# Patient Record
Sex: FEMALE | Race: WHITE | NOT HISPANIC OR LATINO
[De-identification: names, ages, dates, MRNs, and addresses within clinical notes are randomized per-mention and may not be internally consistent; named-entity substitution may affect disease eponyms.]

---
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
## 2019-08-25 ENCOUNTER — TRANSCRIPTION ENCOUNTER (OUTPATIENT)
Age: 19
End: 2019-08-25

## 2020-02-28 ENCOUNTER — APPOINTMENT (OUTPATIENT)
Dept: OBGYN | Facility: CLINIC | Age: 20
End: 2020-02-28

## 2021-09-11 ENCOUNTER — APPOINTMENT (OUTPATIENT)
Dept: OPHTHALMOLOGY | Facility: CLINIC | Age: 21
End: 2021-09-11
Payer: COMMERCIAL

## 2021-09-11 ENCOUNTER — NON-APPOINTMENT (OUTPATIENT)
Age: 21
End: 2021-09-11

## 2021-09-11 PROCEDURE — 92014 COMPRE OPH EXAM EST PT 1/>: CPT

## 2021-09-11 PROCEDURE — 92015 DETERMINE REFRACTIVE STATE: CPT

## 2022-09-10 ENCOUNTER — APPOINTMENT (OUTPATIENT)
Dept: OPHTHALMOLOGY | Facility: CLINIC | Age: 22
End: 2022-09-10

## 2022-09-10 ENCOUNTER — NON-APPOINTMENT (OUTPATIENT)
Age: 22
End: 2022-09-10

## 2022-09-10 PROCEDURE — 92014 COMPRE OPH EXAM EST PT 1/>: CPT

## 2023-02-21 ENCOUNTER — OFFICE (OUTPATIENT)
Dept: URBAN - METROPOLITAN AREA CLINIC 12 | Facility: CLINIC | Age: 23
Setting detail: OPHTHALMOLOGY
End: 2023-02-21
Payer: COMMERCIAL

## 2023-02-21 DIAGNOSIS — H16.223: ICD-10-CM

## 2023-02-21 PROCEDURE — 99024 POSTOP FOLLOW-UP VISIT: CPT | Performed by: OPTOMETRIST

## 2023-02-21 ASSESSMENT — REFRACTION_AUTOREFRACTION
OD_CYLINDER: -0.25
OD_SPHERE: -0.25
OD_AXIS: 073
OS_CYLINDER: -0.50
OS_AXIS: 082
OS_SPHERE: -0.25

## 2023-02-21 ASSESSMENT — REFRACTION_MANIFEST
OS_AXIS: 033
OD_VA1: 20/25
OS_SPHERE: -6.00
OS_CYLINDER: -1.00
OD_CYLINDER: -1.00
OD_AXIS: 160
OD_SPHERE: -5.75
OS_VA1: 20/30-

## 2023-02-21 ASSESSMENT — SPHEQUIV_DERIVED
OD_SPHEQUIV: -0.375
OS_SPHEQUIV: -6.5
OS_SPHEQUIV: -0.5
OD_SPHEQUIV: -6.25

## 2023-02-21 ASSESSMENT — CONFRONTATIONAL VISUAL FIELD TEST (CVF)
OS_FINDINGS: FULL
OD_FINDINGS: FULL

## 2023-02-21 ASSESSMENT — KERATOMETRY
OS_K2POWER_DIOPTERS: 40.25
OS_K1POWER_DIOPTERS: 40.00
OD_AXISANGLE_DEGREES: 078
OD_K1POWER_DIOPTERS: 40.00
OD_K2POWER_DIOPTERS: 40.50
OS_AXISANGLE_DEGREES: 139

## 2023-02-21 ASSESSMENT — AXIALLENGTH_DERIVED
OD_AL: 25.0124
OD_AL: 27.88
OS_AL: 25.1192
OS_AL: 28.0819

## 2023-02-21 ASSESSMENT — REFRACTION_CURRENTRX
OS_SPHERE: -5.50
OS_VPRISM_DIRECTION: SV
OD_CYLINDER: -0.75
OS_AXIS: 030
OS_OVR_VA: 20/
OD_SPHERE: -5.75
OS_CYLINDER: -0.75
OD_VPRISM_DIRECTION: SV
OD_AXIS: 147
OD_OVR_VA: 20/

## 2023-02-21 ASSESSMENT — PACHYMETRY
OD_CT_CORRECTION: 4
OS_CT_UM: 495
OS_CT_CORRECTION: 4
OD_CT_UM: 492

## 2023-02-21 ASSESSMENT — SUPERFICIAL PUNCTATE KERATITIS (SPK)
OS_SPK: 1+ 2+
OD_SPK: 1+ 2+

## 2023-02-21 ASSESSMENT — TONOMETRY: OD_IOP_MMHG: 10

## 2023-02-21 ASSESSMENT — VISUAL ACUITY
OS_BCVA: 20/25+1
OD_BCVA: 20/25+1

## 2023-03-07 ENCOUNTER — OFFICE (OUTPATIENT)
Dept: URBAN - METROPOLITAN AREA CLINIC 12 | Facility: CLINIC | Age: 23
Setting detail: OPHTHALMOLOGY
End: 2023-03-07
Payer: COMMERCIAL

## 2023-03-07 ENCOUNTER — RX ONLY (RX ONLY)
Age: 23
End: 2023-03-07

## 2023-03-07 DIAGNOSIS — H16.223: ICD-10-CM

## 2023-03-07 PROCEDURE — 92012 INTRM OPH EXAM EST PATIENT: CPT | Performed by: OPTOMETRIST

## 2023-03-07 ASSESSMENT — SPHEQUIV_DERIVED
OS_SPHEQUIV: -6.5
OD_SPHEQUIV: -6.25
OD_SPHEQUIV: -1

## 2023-03-07 ASSESSMENT — REFRACTION_MANIFEST
OS_AXIS: 033
OD_AXIS: 160
OS_SPHERE: -6.00
OD_CYLINDER: -1.00
OS_CYLINDER: -1.00
OS_VA1: 20/30-
OD_VA1: 20/25
OD_SPHERE: -5.75

## 2023-03-07 ASSESSMENT — KERATOMETRY
OS_AXISANGLE_DEGREES: 122
OD_K2POWER_DIOPTERS: 40.75
OS_K1POWER_DIOPTERS: 39.25
OD_K1POWER_DIOPTERS: 40.25
OS_K2POWER_DIOPTERS: 40.50
OD_AXISANGLE_DEGREES: 070

## 2023-03-07 ASSESSMENT — SUPERFICIAL PUNCTATE KERATITIS (SPK)
OD_SPK: 1+ 2+
OS_SPK: 1+ 2+

## 2023-03-07 ASSESSMENT — REFRACTION_CURRENTRX
OD_VPRISM_DIRECTION: SV
OD_SPHERE: -5.75
OD_OVR_VA: 20/
OS_SPHERE: -5.50
OS_VPRISM_DIRECTION: SV
OS_AXIS: 030
OD_AXIS: 147
OS_CYLINDER: -0.75
OD_CYLINDER: -0.75
OS_OVR_VA: 20/

## 2023-03-07 ASSESSMENT — REFRACTION_AUTOREFRACTION
OS_AXIS: 080
OD_SPHERE: -0.75
OD_AXIS: 076
OD_CYLINDER: -0.50
OS_SPHERE: PLANO
OS_CYLINDER: -0.50

## 2023-03-07 ASSESSMENT — PACHYMETRY
OD_CT_UM: 492
OS_CT_UM: 495
OS_CT_CORRECTION: 4
OD_CT_CORRECTION: 4

## 2023-03-07 ASSESSMENT — CONFRONTATIONAL VISUAL FIELD TEST (CVF)
OD_FINDINGS: FULL
OS_FINDINGS: FULL

## 2023-03-07 ASSESSMENT — AXIALLENGTH_DERIVED
OS_AL: 28.2126
OD_AL: 27.7526
OD_AL: 25.1841

## 2023-03-07 ASSESSMENT — TONOMETRY
OS_IOP_MMHG: 13
OD_IOP_MMHG: 13

## 2023-03-07 ASSESSMENT — VISUAL ACUITY
OS_BCVA: 20/20
OD_BCVA: 20/20-1

## 2023-06-12 ENCOUNTER — RX ONLY (RX ONLY)
Age: 23
End: 2023-06-12

## 2023-06-12 ENCOUNTER — OFFICE (OUTPATIENT)
Dept: URBAN - METROPOLITAN AREA CLINIC 12 | Facility: CLINIC | Age: 23
Setting detail: OPHTHALMOLOGY
End: 2023-06-12
Payer: COMMERCIAL

## 2023-06-12 DIAGNOSIS — H16.223: ICD-10-CM

## 2023-06-12 PROCEDURE — 99213 OFFICE O/P EST LOW 20 MIN: CPT | Performed by: OPTOMETRIST

## 2023-06-12 ASSESSMENT — SPHEQUIV_DERIVED
OS_SPHEQUIV: -6.5
OD_SPHEQUIV: -6.25

## 2023-06-12 ASSESSMENT — REFRACTION_CURRENTRX
OD_VPRISM_DIRECTION: SV
OS_OVR_VA: 20/
OS_CYLINDER: -0.75
OD_CYLINDER: -0.75
OD_AXIS: 147
OS_SPHERE: -5.50
OS_VPRISM_DIRECTION: SV
OS_AXIS: 030
OD_OVR_VA: 20/
OD_SPHERE: -5.75

## 2023-06-12 ASSESSMENT — KERATOMETRY
OS_K2POWER_DIOPTERS: 40.25
OS_K1POWER_DIOPTERS: 39.25
OD_K1POWER_DIOPTERS: 40.00
OS_AXISANGLE_DEGREES: 131
OD_K2POWER_DIOPTERS: 40.50
OD_AXISANGLE_DEGREES: 048

## 2023-06-12 ASSESSMENT — CONFRONTATIONAL VISUAL FIELD TEST (CVF)
OS_FINDINGS: FULL
OD_FINDINGS: FULL

## 2023-06-12 ASSESSMENT — REFRACTION_AUTOREFRACTION
OD_SPHERE: PLANO
OS_SPHERE: UNABLE
OD_CYLINDER: -0.50
OD_AXIS: 085

## 2023-06-12 ASSESSMENT — REFRACTION_MANIFEST
OS_VA1: 20/30-
OS_AXIS: 033
OS_SPHERE: -6.00
OD_VA1: 20/25
OD_SPHERE: -5.75
OD_CYLINDER: -1.00
OD_AXIS: 160
OS_CYLINDER: -1.00

## 2023-06-12 ASSESSMENT — PACHYMETRY
OD_CT_CORRECTION: 4
OS_CT_CORRECTION: 4
OD_CT_UM: 492
OS_CT_UM: 495

## 2023-06-12 ASSESSMENT — TONOMETRY
OS_IOP_MMHG: 11
OD_IOP_MMHG: 10

## 2023-06-12 ASSESSMENT — SUPERFICIAL PUNCTATE KERATITIS (SPK)
OD_SPK: 1+ 2+
OS_SPK: 1+ 2+

## 2023-06-12 ASSESSMENT — AXIALLENGTH_DERIVED
OD_AL: 27.88
OS_AL: 28.2784

## 2023-06-12 ASSESSMENT — VISUAL ACUITY
OD_BCVA: 20/25+2
OS_BCVA: 20/20-2

## 2023-09-23 ENCOUNTER — APPOINTMENT (OUTPATIENT)
Dept: OPHTHALMOLOGY | Facility: CLINIC | Age: 23
End: 2023-09-23
Payer: COMMERCIAL

## 2023-09-23 ENCOUNTER — NON-APPOINTMENT (OUTPATIENT)
Age: 23
End: 2023-09-23

## 2023-09-23 PROCEDURE — 92014 COMPRE OPH EXAM EST PT 1/>: CPT

## 2023-09-27 ENCOUNTER — OFFICE (OUTPATIENT)
Dept: URBAN - METROPOLITAN AREA CLINIC 12 | Facility: CLINIC | Age: 23
Setting detail: OPHTHALMOLOGY
End: 2023-09-27
Payer: COMMERCIAL

## 2023-09-27 DIAGNOSIS — H16.223: ICD-10-CM

## 2023-09-27 PROCEDURE — 99213 OFFICE O/P EST LOW 20 MIN: CPT | Performed by: OPTOMETRIST

## 2023-09-27 ASSESSMENT — REFRACTION_MANIFEST
OS_SPHERE: -6.00
OS_CYLINDER: -1.00
OS_VA1: 20/30-
OS_AXIS: 033
OD_VA1: 20/25
OD_SPHERE: -5.75
OD_CYLINDER: -1.00
OD_AXIS: 160

## 2023-09-27 ASSESSMENT — KERATOMETRY
OS_K2POWER_DIOPTERS: 40.50
OD_K1POWER_DIOPTERS: 40.25
OS_AXISANGLE_DEGREES: 131
OS_K1POWER_DIOPTERS: 40.00
OD_AXISANGLE_DEGREES: 74
OD_K2POWER_DIOPTERS: 40.75
METHOD_AUTO_MANUAL: AUTO

## 2023-09-27 ASSESSMENT — SPHEQUIV_DERIVED
OD_SPHEQUIV: -0.5
OD_SPHEQUIV: -6.25
OS_SPHEQUIV: -6.5
OS_SPHEQUIV: -0.875

## 2023-09-27 ASSESSMENT — REFRACTION_AUTOREFRACTION
OS_SPHERE: -0.50
OD_AXIS: 77
OS_CYLINDER: -0.75
OD_SPHERE: -0.25
OD_CYLINDER: -0.50
OS_AXIS: 76

## 2023-09-27 ASSESSMENT — SUPERFICIAL PUNCTATE KERATITIS (SPK)
OD_SPK: 1+ 2+
OS_SPK: 1+ 2+

## 2023-09-27 ASSESSMENT — AXIALLENGTH_DERIVED
OS_AL: 28.017
OD_AL: 27.7526
OD_AL: 24.9641
OS_AL: 25.2333

## 2023-09-27 ASSESSMENT — PACHYMETRY
OD_CT_UM: 492
OD_CT_CORRECTION: 4
OS_CT_CORRECTION: 4
OS_CT_UM: 495

## 2023-09-27 ASSESSMENT — CONFRONTATIONAL VISUAL FIELD TEST (CVF)
OS_FINDINGS: FULL
OD_FINDINGS: FULL

## 2023-09-27 ASSESSMENT — REFRACTION_CURRENTRX
OS_AXIS: 030
OD_VPRISM_DIRECTION: SV
OD_OVR_VA: 20/
OD_AXIS: 147
OD_SPHERE: -5.75
OS_OVR_VA: 20/
OD_CYLINDER: -0.75
OS_SPHERE: -5.50
OS_VPRISM_DIRECTION: SV
OS_CYLINDER: -0.75

## 2023-09-27 ASSESSMENT — TONOMETRY
OS_IOP_MMHG: 10
OD_IOP_MMHG: 10

## 2023-09-27 ASSESSMENT — VISUAL ACUITY
OD_BCVA: 20/25+2
OS_BCVA: 20/20-2

## 2024-01-10 ENCOUNTER — OFFICE (OUTPATIENT)
Dept: URBAN - METROPOLITAN AREA CLINIC 12 | Facility: CLINIC | Age: 24
Setting detail: OPHTHALMOLOGY
End: 2024-01-10
Payer: COMMERCIAL

## 2024-01-10 DIAGNOSIS — H16.223: ICD-10-CM

## 2024-01-10 PROCEDURE — 99213 OFFICE O/P EST LOW 20 MIN: CPT | Performed by: OPTOMETRIST

## 2024-01-10 ASSESSMENT — REFRACTION_MANIFEST
OS_VA1: 20/30-
OD_CYLINDER: -1.00
OS_AXIS: 033
OS_CYLINDER: -1.00
OS_SPHERE: -6.00
OD_AXIS: 160
OD_SPHERE: -5.75
OD_VA1: 20/25

## 2024-01-10 ASSESSMENT — REFRACTION_AUTOREFRACTION
OD_SPHERE: -0.25
OD_CYLINDER: -0.25
OS_CYLINDER: -0.25
OD_AXIS: 097
OS_SPHERE: -0.75
OS_AXIS: 033

## 2024-01-10 ASSESSMENT — SUPERFICIAL PUNCTATE KERATITIS (SPK)
OD_SPK: 1+
OS_SPK: 1+

## 2024-01-10 ASSESSMENT — REFRACTION_CURRENTRX
OD_SPHERE: -5.75
OD_AXIS: 147
OS_OVR_VA: 20/
OD_OVR_VA: 20/
OS_VPRISM_DIRECTION: SV
OS_SPHERE: -5.50
OD_CYLINDER: -0.75
OS_CYLINDER: -0.75
OD_VPRISM_DIRECTION: SV
OS_AXIS: 030

## 2024-01-10 ASSESSMENT — SPHEQUIV_DERIVED
OS_SPHEQUIV: -6.5
OS_SPHEQUIV: -0.875
OD_SPHEQUIV: -0.375
OD_SPHEQUIV: -6.25

## 2024-01-10 ASSESSMENT — CONFRONTATIONAL VISUAL FIELD TEST (CVF)
OD_FINDINGS: FULL
OS_FINDINGS: FULL

## 2024-04-21 ENCOUNTER — NON-APPOINTMENT (OUTPATIENT)
Age: 24
End: 2024-04-21

## 2024-05-23 ENCOUNTER — APPOINTMENT (OUTPATIENT)
Dept: GASTROENTEROLOGY | Facility: CLINIC | Age: 24
End: 2024-05-23

## 2024-05-23 VITALS
HEIGHT: 60 IN | SYSTOLIC BLOOD PRESSURE: 98 MMHG | DIASTOLIC BLOOD PRESSURE: 78 MMHG | WEIGHT: 115 LBS | BODY MASS INDEX: 22.58 KG/M2

## 2024-05-23 DIAGNOSIS — Z00.00 ENCOUNTER FOR GENERAL ADULT MEDICAL EXAMINATION W/OUT ABNORMAL FINDINGS: ICD-10-CM

## 2024-05-23 DIAGNOSIS — K59.09 OTHER CONSTIPATION: ICD-10-CM

## 2024-05-23 PROCEDURE — 99203 OFFICE O/P NEW LOW 30 MIN: CPT

## 2024-05-23 RX ORDER — DROSPIRENONE AND ETHINYL ESTRADIOL 0.02-3(28)
3-0.02 KIT ORAL
Refills: 0 | Status: ACTIVE | COMMUNITY

## 2024-05-23 RX ORDER — LINACLOTIDE 72 UG/1
72 CAPSULE, GELATIN COATED ORAL
Qty: 30 | Refills: 3 | Status: ACTIVE | COMMUNITY
Start: 2024-05-23 | End: 1900-01-01

## 2024-05-23 RX ORDER — SERTRALINE HYDROCHLORIDE 25 MG/1
TABLET, FILM COATED ORAL
Refills: 0 | Status: ACTIVE | COMMUNITY

## 2024-05-23 NOTE — HISTORY OF PRESENT ILLNESS
[FreeTextEntry1] : Namrata Nuñez is a 24 year old female presenting today for initial evaluation of constipation. Pt reports she has struggled with longstanding chronic constipation for years but over recent months it has worsened. Has tried OTC miralax and fiber supplements with minimal relief. Denies any bleeding such as melena or hematochezia. Does note it fluctuates with her menstrual cycle often.

## 2024-05-23 NOTE — PHYSICAL EXAM
[Alert] : alert [Healthy Appearing] : healthy appearing [Sclera] : the sclera and conjunctiva were normal [Hearing Threshold Finger Rub Not Independence] : hearing was normal [Normal Appearance] : the appearance of the neck was normal [No Respiratory Distress] : no respiratory distress [Auscultation Breath Sounds / Voice Sounds] : lungs were clear to auscultation bilaterally [Heart Rate And Rhythm] : heart rate was normal and rhythm regular [Bowel Sounds] : normal bowel sounds [Abdomen Tenderness] : non-tender [Abdomen Soft] : soft [Abnormal Walk] : normal gait [Normal Color / Pigmentation] : normal skin color and pigmentation [Oriented To Time, Place, And Person] : oriented to person, place, and time

## 2024-05-23 NOTE — ASSESSMENT
[FreeTextEntry1] : Plan: Since pt has constipation unrelieved by OTC medications, would recommend trial of Linzess 72G. Provided samples. If successful, will send prescription. Pt agreeable. Seen and discussed with Dr. Narayanan.

## 2024-08-26 ENCOUNTER — OFFICE (OUTPATIENT)
Dept: URBAN - METROPOLITAN AREA CLINIC 12 | Facility: CLINIC | Age: 24
Setting detail: OPHTHALMOLOGY
End: 2024-08-26
Payer: COMMERCIAL

## 2024-08-26 DIAGNOSIS — H16.223: ICD-10-CM

## 2024-08-26 PROCEDURE — 92012 INTRM OPH EXAM EST PATIENT: CPT | Performed by: OPTOMETRIST

## 2024-08-26 ASSESSMENT — CONFRONTATIONAL VISUAL FIELD TEST (CVF)
OS_FINDINGS: FULL
OD_FINDINGS: FULL

## 2024-10-05 ENCOUNTER — APPOINTMENT (OUTPATIENT)
Dept: OPHTHALMOLOGY | Facility: CLINIC | Age: 24
End: 2024-10-05

## 2024-11-27 ENCOUNTER — OFFICE (OUTPATIENT)
Dept: URBAN - METROPOLITAN AREA CLINIC 12 | Facility: CLINIC | Age: 24
Setting detail: OPHTHALMOLOGY
End: 2024-11-27
Payer: COMMERCIAL

## 2024-11-27 DIAGNOSIS — H10.45: ICD-10-CM

## 2024-11-27 DIAGNOSIS — H16.223: ICD-10-CM

## 2024-11-27 PROCEDURE — 99213 OFFICE O/P EST LOW 20 MIN: CPT | Mod: 25 | Performed by: OPTOMETRIST

## 2024-11-27 PROCEDURE — 68761 CLOSE TEAR DUCT OPENING: CPT | Mod: 50 | Performed by: OPTOMETRIST

## 2024-11-27 ASSESSMENT — KERATOMETRY
OS_K1POWER_DIOPTERS: 39.75
OS_K2POWER_DIOPTERS: 40.50
OD_K2POWER_DIOPTERS: 40.50
OD_K1POWER_DIOPTERS: 40.00
OS_AXISANGLE_DEGREES: 126
OD_AXISANGLE_DEGREES: 037
METHOD_AUTO_MANUAL: AUTO

## 2024-11-27 ASSESSMENT — REFRACTION_AUTOREFRACTION
OD_AXIS: 098
OS_CYLINDER: -0.75
OS_AXIS: 067
OD_SPHERE: PLANO
OS_SPHERE: PLANO
OD_CYLINDER: -0.75

## 2024-11-27 ASSESSMENT — REFRACTION_MANIFEST
OD_CYLINDER: -1.00
OD_SPHERE: -5.75
OD_AXIS: 160
OS_AXIS: 033
OS_CYLINDER: -1.00
OS_SPHERE: -6.00
OD_VA1: 20/25
OS_VA1: 20/30-

## 2024-11-27 ASSESSMENT — REFRACTION_CURRENTRX
OD_OVR_VA: 20/
OS_CYLINDER: -0.75
OS_SPHERE: -5.50
OD_CYLINDER: -0.75
OS_AXIS: 030
OS_OVR_VA: 20/
OD_VPRISM_DIRECTION: SV
OD_SPHERE: -5.75
OD_AXIS: 147
OS_VPRISM_DIRECTION: SV

## 2024-11-27 ASSESSMENT — SUPERFICIAL PUNCTATE KERATITIS (SPK)
OS_SPK: T 1+
OD_SPK: T 1+

## 2024-11-27 ASSESSMENT — VISUAL ACUITY
OS_BCVA: 20/20
OD_BCVA: 20/20

## 2024-11-27 ASSESSMENT — CONFRONTATIONAL VISUAL FIELD TEST (CVF)
OD_FINDINGS: FULL
OS_FINDINGS: FULL

## 2024-12-23 ENCOUNTER — APPOINTMENT (OUTPATIENT)
Dept: GASTROENTEROLOGY | Facility: CLINIC | Age: 24
End: 2024-12-23
Payer: COMMERCIAL

## 2024-12-23 VITALS
BODY MASS INDEX: 23.56 KG/M2 | DIASTOLIC BLOOD PRESSURE: 82 MMHG | SYSTOLIC BLOOD PRESSURE: 112 MMHG | HEIGHT: 60 IN | WEIGHT: 120 LBS

## 2024-12-23 DIAGNOSIS — K59.00 CONSTIPATION, UNSPECIFIED: ICD-10-CM

## 2024-12-23 PROCEDURE — 99214 OFFICE O/P EST MOD 30 MIN: CPT

## 2024-12-23 RX ORDER — HYDROCORTISONE ACETATE, PRAMOXINE HCL 2.5; 1 G/100G; G/100G
2.5-1 CREAM TOPICAL 3 TIMES DAILY
Qty: 1 | Refills: 0 | Status: ACTIVE | COMMUNITY
Start: 2024-12-23 | End: 1900-01-01

## 2025-03-18 ENCOUNTER — OFFICE (OUTPATIENT)
Dept: URBAN - METROPOLITAN AREA CLINIC 12 | Facility: CLINIC | Age: 25
Setting detail: OPHTHALMOLOGY
End: 2025-03-18
Payer: COMMERCIAL

## 2025-03-18 ENCOUNTER — RX ONLY (RX ONLY)
Age: 25
End: 2025-03-18

## 2025-03-18 DIAGNOSIS — H16.223: ICD-10-CM

## 2025-03-18 PROCEDURE — 92012 INTRM OPH EXAM EST PATIENT: CPT | Mod: 25 | Performed by: OPTOMETRIST

## 2025-03-18 PROCEDURE — 68761 CLOSE TEAR DUCT OPENING: CPT | Mod: 50 | Performed by: OPTOMETRIST

## 2025-03-18 ASSESSMENT — KERATOMETRY
OD_AXISANGLE_DEGREES: 062
OS_K2POWER_DIOPTERS: 40.75
OD_K2POWER_DIOPTERS: 40.75
OS_K1POWER_DIOPTERS: 40.00
METHOD_AUTO_MANUAL: AUTO
OD_K1POWER_DIOPTERS: 40.00
OS_AXISANGLE_DEGREES: 126

## 2025-03-18 ASSESSMENT — REFRACTION_MANIFEST
OS_SPHERE: -6.00
OS_AXIS: 033
OD_CYLINDER: -1.00
OS_CYLINDER: -1.00
OD_AXIS: 160
OD_VA1: 20/25
OS_VA1: 20/30-
OD_SPHERE: -5.75

## 2025-03-18 ASSESSMENT — REFRACTION_CURRENTRX
OD_SPHERE: -5.75
OS_CYLINDER: -0.75
OS_SPHERE: -5.50
OS_AXIS: 030
OD_OVR_VA: 20/
OS_OVR_VA: 20/
OS_VPRISM_DIRECTION: SV
OD_AXIS: 147
OD_CYLINDER: -0.75
OD_VPRISM_DIRECTION: SV

## 2025-03-18 ASSESSMENT — REFRACTION_AUTOREFRACTION
OS_AXIS: 045
OD_AXIS: 098
OS_SPHERE: -0.50
OS_CYLINDER: -0.50
OD_CYLINDER: -0.25
OD_SPHERE: -0.50

## 2025-03-18 ASSESSMENT — TONOMETRY: OD_IOP_MMHG: 10

## 2025-03-18 ASSESSMENT — VISUAL ACUITY
OD_BCVA: 20/20-
OS_BCVA: 20/25+2

## 2025-03-18 ASSESSMENT — CONFRONTATIONAL VISUAL FIELD TEST (CVF)
OS_FINDINGS: FULL
OD_FINDINGS: FULL

## 2025-03-18 ASSESSMENT — PACHYMETRY
OS_CT_CORRECTION: 4
OD_CT_UM: 492
OD_CT_CORRECTION: 4
OS_CT_UM: 495

## 2025-03-18 ASSESSMENT — SUPERFICIAL PUNCTATE KERATITIS (SPK)
OD_SPK: 1+ 2+
OS_SPK: 1+ 2+

## 2025-03-18 ASSESSMENT — DRY EYES - PHYSICIAN NOTES
OS_GENERALCOMMENTS: INFERIOR
OD_GENERALCOMMENTS: INFERIOR

## 2025-07-15 ENCOUNTER — OFFICE (OUTPATIENT)
Dept: URBAN - METROPOLITAN AREA CLINIC 12 | Facility: CLINIC | Age: 25
Setting detail: OPHTHALMOLOGY
End: 2025-07-15
Payer: COMMERCIAL

## 2025-07-15 DIAGNOSIS — H16.223: ICD-10-CM

## 2025-07-15 PROCEDURE — 92014 COMPRE OPH EXAM EST PT 1/>: CPT | Mod: 25 | Performed by: OPTOMETRIST

## 2025-07-15 PROCEDURE — 68761 CLOSE TEAR DUCT OPENING: CPT | Mod: 50 | Performed by: OPTOMETRIST

## 2025-07-15 ASSESSMENT — REFRACTION_CURRENTRX
OS_OVR_VA: 20/
OS_SPHERE: -5.50
OS_VPRISM_DIRECTION: SV
OD_CYLINDER: -0.75
OS_AXIS: 030
OD_SPHERE: -5.75
OD_VPRISM_DIRECTION: SV
OS_CYLINDER: -0.75
OD_AXIS: 147
OD_OVR_VA: 20/

## 2025-07-15 ASSESSMENT — REFRACTION_MANIFEST
OD_AXIS: 160
OD_SPHERE: -5.75
OD_VA1: 20/25
OD_CYLINDER: -1.00
OS_SPHERE: -6.00
OS_VA1: 20/30-
OS_AXIS: 033
OS_CYLINDER: -1.00

## 2025-07-15 ASSESSMENT — CONFRONTATIONAL VISUAL FIELD TEST (CVF)
OS_FINDINGS: FULL
OD_FINDINGS: FULL

## 2025-07-15 ASSESSMENT — SUPERFICIAL PUNCTATE KERATITIS (SPK)
OD_SPK: 1+ 2+
OS_SPK: 1+ 2+

## 2025-07-15 ASSESSMENT — REFRACTION_AUTOREFRACTION
OD_AXIS: 000
OS_CYLINDER: -0.25
OS_AXIS: 054
OD_SPHERE: PLANO
OD_CYLINDER: SPHERE
OS_SPHERE: -0.50

## 2025-07-15 ASSESSMENT — KERATOMETRY
METHOD_AUTO_MANUAL: AUTO
OS_K1POWER_DIOPTERS: 40.25
OD_K2POWER_DIOPTERS: 40.75
OS_AXISANGLE_DEGREES: 124
OD_K1POWER_DIOPTERS: 40.25
OS_K2POWER_DIOPTERS: 40.75
OD_AXISANGLE_DEGREES: 081

## 2025-07-15 ASSESSMENT — DRY EYES - PHYSICIAN NOTES
OD_GENERALCOMMENTS: INFERIOR
OS_GENERALCOMMENTS: INFERIOR

## 2025-07-15 ASSESSMENT — VISUAL ACUITY
OD_BCVA: 20/20
OS_BCVA: 20/20